# Patient Record
Sex: FEMALE | Race: WHITE | URBAN - NONMETROPOLITAN AREA
[De-identification: names, ages, dates, MRNs, and addresses within clinical notes are randomized per-mention and may not be internally consistent; named-entity substitution may affect disease eponyms.]

---

## 2022-10-22 ENCOUNTER — WEB ENCOUNTER (OUTPATIENT)
Dept: URBAN - NONMETROPOLITAN AREA CLINIC 2 | Facility: CLINIC | Age: 52
End: 2022-10-22

## 2023-03-17 ENCOUNTER — OFFICE VISIT (OUTPATIENT)
Dept: URBAN - NONMETROPOLITAN AREA CLINIC 2 | Facility: CLINIC | Age: 53
End: 2023-03-17
Payer: COMMERCIAL

## 2023-03-17 ENCOUNTER — LAB OUTSIDE AN ENCOUNTER (OUTPATIENT)
Dept: URBAN - NONMETROPOLITAN AREA CLINIC 2 | Facility: CLINIC | Age: 53
End: 2023-03-17

## 2023-03-17 ENCOUNTER — WEB ENCOUNTER (OUTPATIENT)
Dept: URBAN - NONMETROPOLITAN AREA CLINIC 2 | Facility: CLINIC | Age: 53
End: 2023-03-17

## 2023-03-17 VITALS
SYSTOLIC BLOOD PRESSURE: 109 MMHG | HEART RATE: 75 BPM | TEMPERATURE: 97.8 F | HEIGHT: 62 IN | WEIGHT: 113 LBS | DIASTOLIC BLOOD PRESSURE: 74 MMHG | BODY MASS INDEX: 20.8 KG/M2

## 2023-03-17 DIAGNOSIS — R10.13 DYSPEPSIA: ICD-10-CM

## 2023-03-17 PROBLEM — 430329007: Status: ACTIVE | Noted: 2023-03-17

## 2023-03-17 PROCEDURE — 99244 OFF/OP CNSLTJ NEW/EST MOD 40: CPT | Performed by: INTERNAL MEDICINE

## 2023-03-17 PROCEDURE — 99204 OFFICE O/P NEW MOD 45 MIN: CPT | Performed by: INTERNAL MEDICINE

## 2023-03-17 RX ORDER — SODIUM PICOSULFATE, MAGNESIUM OXIDE, AND ANHYDROUS CITRIC ACID 10; 3.5; 12 MG/160ML; G/160ML; G/160ML
160ML X 2 AS DIRECTED LIQUID ORAL ONCE
Qty: 320 MILLILITER | Refills: 0 | OUTPATIENT
Start: 2023-03-17

## 2023-03-17 NOTE — HPI-TODAY'S VISIT:
3/17/2023 Kathleen presents for colorectal cancer screening.  Her last was at age 32 by Dr. Garcia.  She denies any lower GI complaints.  Recently she has had increased dyspepsia.  She has a strong family history of celiac disease.  She does still have her gallbladder.  She has tried PPIs over-the-counter in the past.  She does take Tums as needed with some relief.  She has not had an upper endoscopy in the recent past.  Her father had celiac disease diagnosed by Dr. Garcia.  Today she agrees to labs, H. pylori breath test, and Pepcid 20 mg daily.  If no relief consider EGD for further evaluation.  We will schedule her colonoscopy as a screening.  MB

## 2023-03-19 LAB
A/G RATIO: 2.5
ALBUMIN: 4.7
ALKALINE PHOSPHATASE: 36
ALT (SGPT): 15
AST (SGOT): 19
BASO (ABSOLUTE): 0.1
BASOS: 1
BILIRUBIN, TOTAL: 0.2
BUN/CREATININE RATIO: 26
BUN: 20
C-REACTIVE PROTEIN, QUANT: <1
CALCIUM: 9.6
CARBON DIOXIDE, TOTAL: 27
CHLORIDE: 100
CREATININE: 0.78
DEAMIDATED GLIADIN ABS, IGA: 8
DEAMIDATED GLIADIN ABS, IGG: 2
EGFR: 92
ENDOMYSIAL ANTIBODY IGA: NEGATIVE
EOS (ABSOLUTE): 0.1
EOS: 1
GLOBULIN, TOTAL: 1.9
GLUCOSE: 101
HEMATOCRIT: 39.2
HEMATOLOGY COMMENTS:: (no result)
HEMOGLOBIN: 13.3
IMMATURE CELLS: (no result)
IMMATURE GRANS (ABS): 0
IMMATURE GRANULOCYTES: 0
IMMUNOGLOBULIN A, QN, SERUM: 217
LYMPHS (ABSOLUTE): 1.4
LYMPHS: 21
Lab: (no result)
MCH: 31.8
MCHC: 33.9
MCV: 94
MONOCYTES(ABSOLUTE): 0.4
MONOCYTES: 6
NEUTROPHILS (ABSOLUTE): 4.6
NEUTROPHILS: 71
NRBC: (no result)
PLATELETS: 243
POTASSIUM: 4.1
PROTEIN, TOTAL: 6.6
RBC: 4.18
RDW: 12.1
SEDIMENTATION RATE-WESTERGREN: 3
SODIUM: 139
T-TRANSGLUTAMINASE (TTG) IGA: <2
T-TRANSGLUTAMINASE (TTG) IGG: 7
T4,FREE(DIRECT): 0.95
TSH: 1.31
WBC: 6.6

## 2023-03-20 ENCOUNTER — LAB OUTSIDE AN ENCOUNTER (OUTPATIENT)
Dept: URBAN - NONMETROPOLITAN AREA CLINIC 2 | Facility: CLINIC | Age: 53
End: 2023-03-20

## 2023-03-21 ENCOUNTER — LAB OUTSIDE AN ENCOUNTER (OUTPATIENT)
Dept: URBAN - METROPOLITAN AREA CLINIC 35 | Facility: CLINIC | Age: 53
End: 2023-03-21

## 2023-03-21 ENCOUNTER — WEB ENCOUNTER (OUTPATIENT)
Dept: URBAN - NONMETROPOLITAN AREA CLINIC 2 | Facility: CLINIC | Age: 53
End: 2023-03-21

## 2023-03-21 ENCOUNTER — TELEPHONE ENCOUNTER (OUTPATIENT)
Dept: URBAN - METROPOLITAN AREA CLINIC 35 | Facility: CLINIC | Age: 53
End: 2023-03-21

## 2023-03-22 LAB
A/G RATIO: (no result)
ALBUMIN: (no result)
ALKALINE PHOSPHATASE: (no result)
ALT (SGPT): (no result)
AST (SGOT): (no result)
BASO (ABSOLUTE): (no result)
BASOS: (no result)
BILIRUBIN, TOTAL: (no result)
BUN/CREATININE RATIO: (no result)
BUN: (no result)
C-REACTIVE PROTEIN, QUANT: (no result)
CALCIUM: (no result)
CARBON DIOXIDE, TOTAL: (no result)
CHLORIDE: (no result)
CHOLESTEROL, TOTAL: (no result)
COMMENT:: (no result)
CREATININE: (no result)
DEAMIDATED GLIADIN ABS, IGA: (no result)
DEAMIDATED GLIADIN ABS, IGG: (no result)
EGFR: (no result)
ENDOMYSIAL ANTIBODY IGA: (no result)
EOS (ABSOLUTE): (no result)
EOS: (no result)
FERRITIN: (no result)
FOLATE (FOLIC ACID), SERUM: (no result)
FREE TESTOSTERONE(DIRECT): (no result)
GLOBULIN, TOTAL: (no result)
GLUCOSE: (no result)
H PYLORI BREATH TEST: NEGATIVE
HDL CHOLESTEROL: (no result)
HEMATOCRIT: (no result)
HEMATOLOGY COMMENTS:: (no result)
HEMOGLOBIN A1C: (no result)
HEMOGLOBIN: (no result)
IMMATURE CELLS: (no result)
IMMATURE GRANS (ABS): (no result)
IMMATURE GRANULOCYTES: (no result)
IMMUNOGLOBULIN A, QN, SERUM: (no result)
LDL CHOL CALC (NIH): (no result)
LYMPHS (ABSOLUTE): (no result)
LYMPHS: (no result)
MAGNESIUM: (no result)
MCH: (no result)
MCHC: (no result)
MCV: (no result)
MONOCYTES(ABSOLUTE): (no result)
MONOCYTES: (no result)
NEUTROPHILS (ABSOLUTE): (no result)
NEUTROPHILS: (no result)
NRBC: (no result)
PLATELETS: (no result)
POTASSIUM: (no result)
PROSTATE SPECIFIC AG: (no result)
PROTEIN, TOTAL: (no result)
RBC: (no result)
RDW: (no result)
REQUEST PROBLEM: (no result)
SEDIMENTATION RATE-WESTERGREN: (no result)
SODIUM: (no result)
T-TRANSGLUTAMINASE (TTG) IGA: (no result)
T-TRANSGLUTAMINASE (TTG) IGG: (no result)
T4,FREE(DIRECT): (no result)
TESTOSTERONE, TOTAL, LC/MS: (no result)
TRIGLYCERIDES: (no result)
TRIIODOTHYRONINE (T3), FREE: (no result)
TSH: (no result)
VITAMIN B12: (no result)
VITAMIN D, 25-HYDROXY: (no result)
VLDL CHOLESTEROL CAL: (no result)
WBC: (no result)

## 2023-03-27 ENCOUNTER — WEB ENCOUNTER (OUTPATIENT)
Dept: URBAN - NONMETROPOLITAN AREA CLINIC 2 | Facility: CLINIC | Age: 53
End: 2023-03-27

## 2023-05-08 ENCOUNTER — WEB ENCOUNTER (OUTPATIENT)
Dept: URBAN - NONMETROPOLITAN AREA CLINIC 2 | Facility: CLINIC | Age: 53
End: 2023-05-08

## 2023-05-08 RX ORDER — SODIUM PICOSULFATE, MAGNESIUM OXIDE, AND ANHYDROUS CITRIC ACID 10; 3.5; 12 MG/160ML; G/160ML; G/160ML
160ML X 2 AS DIRECTED LIQUID ORAL ONCE
Qty: 320 MILLILITER | Refills: 0
Start: 2023-03-17 | End: 2023-05-10

## 2023-05-09 ENCOUNTER — ERX REFILL RESPONSE (OUTPATIENT)
Dept: URBAN - NONMETROPOLITAN AREA CLINIC 2 | Facility: CLINIC | Age: 53
End: 2023-05-09

## 2023-05-09 RX ORDER — SODIUM PICOSULFATE, MAGNESIUM OXIDE, AND ANHYDROUS CITRIC ACID 10; 3.5; 12 MG/160ML; G/160ML; G/160ML
160ML X 2 AS DIRECTED LIQUID ORAL ONCE
Qty: 320 MILLILITER | Refills: 0 | OUTPATIENT

## 2023-05-09 RX ORDER — SODIUM PICOSULFATE, MAGNESIUM OXIDE, AND ANHYDROUS CITRIC ACID 12; 3.5; 1 G/175ML; G/175ML; MG/175ML
175 ML THE FIRST DOSE THE EVENING BEFORE AND SECOND DOSE THE MORNING OF COLONOSCOPY LIQUID ORAL ONCE A DAY
Qty: 350 | Refills: 0 | OUTPATIENT

## 2023-05-11 ENCOUNTER — WEB ENCOUNTER (OUTPATIENT)
Dept: URBAN - NONMETROPOLITAN AREA CLINIC 2 | Facility: CLINIC | Age: 53
End: 2023-05-11

## 2023-05-11 RX ORDER — SOD SULF/POT CHLORIDE/MAG SULF 1.479 G
24 TABLET TABLET ORAL ONCE
Qty: 1 UNSPECIFIED | Refills: 0 | OUTPATIENT
Start: 2023-05-12 | End: 2023-05-13

## 2023-05-22 ENCOUNTER — CLAIMS CREATED FROM THE CLAIM WINDOW (OUTPATIENT)
Dept: URBAN - NONMETROPOLITAN AREA SURGERY CENTER 1 | Facility: SURGERY CENTER | Age: 53
End: 2023-05-22

## 2023-05-22 ENCOUNTER — CLAIMS CREATED FROM THE CLAIM WINDOW (OUTPATIENT)
Dept: URBAN - NONMETROPOLITAN AREA SURGERY CENTER 1 | Facility: SURGERY CENTER | Age: 53
End: 2023-05-22
Payer: COMMERCIAL

## 2023-05-22 DIAGNOSIS — Z12.11 COLON CANCER SCREENING: ICD-10-CM

## 2023-05-22 DIAGNOSIS — K20.0 ALLERGIC EOSINOPHILIC ESOPHAGITIS: ICD-10-CM

## 2023-05-22 DIAGNOSIS — K29.80 ACUTE DUODENITIS: ICD-10-CM

## 2023-05-22 DIAGNOSIS — K29.60 ADENOPAPILLOMATOSIS GASTRICA: ICD-10-CM

## 2023-05-22 PROCEDURE — 43239 EGD BIOPSY SINGLE/MULTIPLE: CPT | Performed by: INTERNAL MEDICINE

## 2023-05-22 PROCEDURE — G8907 PT DOC NO EVENTS ON DISCHARG: HCPCS | Performed by: INTERNAL MEDICINE

## 2023-05-22 PROCEDURE — G0121 COLON CA SCRN NOT HI RSK IND: HCPCS | Performed by: INTERNAL MEDICINE

## 2023-05-22 RX ORDER — SODIUM PICOSULFATE, MAGNESIUM OXIDE, AND ANHYDROUS CITRIC ACID 12; 3.5; 1 G/175ML; G/175ML; MG/175ML
175 ML THE FIRST DOSE THE EVENING BEFORE AND SECOND DOSE THE MORNING OF COLONOSCOPY LIQUID ORAL ONCE A DAY
Qty: 350 | Refills: 0 | Status: ACTIVE | COMMUNITY

## 2023-05-23 ENCOUNTER — TELEPHONE ENCOUNTER (OUTPATIENT)
Dept: URBAN - NONMETROPOLITAN AREA CLINIC 2 | Facility: CLINIC | Age: 53
End: 2023-05-23

## 2023-05-23 RX ORDER — AZITHROMYCIN MONOHYDRATE 250 MG/1
TAKE 2 PILLS DAILY ONE AND THEN 1 PILL DAILY FOR 4 ADDITIONAL DAYS TABLET, FILM COATED ORAL ONCE DAILY
Qty: 6 | Refills: 0 | OUTPATIENT
Start: 2023-05-23 | End: 2023-05-28

## 2023-05-30 ENCOUNTER — WEB ENCOUNTER (OUTPATIENT)
Dept: URBAN - NONMETROPOLITAN AREA CLINIC 2 | Facility: CLINIC | Age: 53
End: 2023-05-30

## 2023-05-31 ENCOUNTER — WEB ENCOUNTER (OUTPATIENT)
Dept: URBAN - NONMETROPOLITAN AREA CLINIC 2 | Facility: CLINIC | Age: 53
End: 2023-05-31

## 2023-06-07 ENCOUNTER — TELEPHONE ENCOUNTER (OUTPATIENT)
Dept: URBAN - NONMETROPOLITAN AREA CLINIC 2 | Facility: CLINIC | Age: 53
End: 2023-06-07

## 2023-06-08 ENCOUNTER — WEB ENCOUNTER (OUTPATIENT)
Dept: URBAN - NONMETROPOLITAN AREA CLINIC 2 | Facility: CLINIC | Age: 53
End: 2023-06-08

## 2023-06-12 ENCOUNTER — OFFICE VISIT (OUTPATIENT)
Dept: URBAN - NONMETROPOLITAN AREA CLINIC 2 | Facility: CLINIC | Age: 53
End: 2023-06-12
Payer: COMMERCIAL

## 2023-06-12 VITALS
TEMPERATURE: 98.5 F | SYSTOLIC BLOOD PRESSURE: 114 MMHG | HEART RATE: 73 BPM | WEIGHT: 113 LBS | HEIGHT: 62 IN | BODY MASS INDEX: 20.8 KG/M2 | DIASTOLIC BLOOD PRESSURE: 77 MMHG

## 2023-06-12 DIAGNOSIS — Z83.79 FAMILY HISTORY OF CELIAC DISEASE: ICD-10-CM

## 2023-06-12 DIAGNOSIS — R89.4 ABNORMAL CELIAC ANTIBODY PANEL: ICD-10-CM

## 2023-06-12 DIAGNOSIS — R10.13 DYSPEPSIA: ICD-10-CM

## 2023-06-12 DIAGNOSIS — K20.0 EOSINOPHILIC ESOPHAGITIS: ICD-10-CM

## 2023-06-12 DIAGNOSIS — Z12.11 COLON CANCER SCREENING: ICD-10-CM

## 2023-06-12 PROBLEM — 305058001: Status: ACTIVE | Noted: 2023-03-17

## 2023-06-12 PROBLEM — 166166006: Status: ACTIVE | Noted: 2023-03-21

## 2023-06-12 PROBLEM — 235599003: Status: ACTIVE | Noted: 2023-06-12

## 2023-06-12 PROBLEM — 162031009: Status: ACTIVE | Noted: 2023-03-17

## 2023-06-12 PROCEDURE — 99214 OFFICE O/P EST MOD 30 MIN: CPT | Performed by: NURSE PRACTITIONER

## 2023-06-12 RX ORDER — SODIUM PICOSULFATE, MAGNESIUM OXIDE, AND ANHYDROUS CITRIC ACID 12; 3.5; 1 G/175ML; G/175ML; MG/175ML
175 ML THE FIRST DOSE THE EVENING BEFORE AND SECOND DOSE THE MORNING OF COLONOSCOPY LIQUID ORAL ONCE A DAY
Qty: 350 | Refills: 0 | Status: ACTIVE | COMMUNITY

## 2023-06-12 NOTE — HPI-TODAY'S VISIT:
3/17/2023 Kathleen presents for colorectal cancer screening.  Her last was at age 32 by Dr. Garcia.  She denies any lower GI complaints.  Recently she has had increased dyspepsia.  She has a strong family history of celiac disease.  She does still have her gallbladder.  She has tried PPIs over-the-counter in the past.  She does take Tums as needed with some relief.  She has not had an upper endoscopy in the recent past.  Her father had celiac disease diagnosed by Dr. Garcia.  Today she agrees to labs, H. pylori breath test, and Pepcid 20 mg daily.  If no relief consider EGD for further evaluation.  We will schedule her colonoscopy as a screening.  MB 6/12/2023 Kathleen presents for endoscopy follow-up.  Her TTG IgG was mildly elevated at 7.  Her colonoscopy was normal.  Her EGD shows normal small bowel, she has mild duodenitis on biopsies but she has eosinophilic esophagitis on esophageal biopsies.  She had no rings or furrows on endoscopy.  Her dyspepsia has seemed to improved on a gluten-free diet.  She is tolerating dairy.  Today she is doing well from a GI standpoint.  MB

## 2023-06-12 NOTE — PHYSICAL EXAM SKIN:
no rashes , no suspicious lesions Propranolol Pregnancy And Lactation Text: This medication is Pregnancy Category C and it isn't known if it is safe during pregnancy. It is excreted in breast milk.

## 2023-08-22 ENCOUNTER — WEB ENCOUNTER (OUTPATIENT)
Dept: URBAN - NONMETROPOLITAN AREA CLINIC 2 | Facility: CLINIC | Age: 53
End: 2023-08-22

## 2023-09-12 ENCOUNTER — OFFICE VISIT (OUTPATIENT)
Dept: URBAN - NONMETROPOLITAN AREA CLINIC 2 | Facility: CLINIC | Age: 53
End: 2023-09-12

## 2023-11-21 ENCOUNTER — WEB ENCOUNTER (OUTPATIENT)
Dept: URBAN - NONMETROPOLITAN AREA CLINIC 2 | Facility: CLINIC | Age: 53
End: 2023-11-21

## 2023-12-11 ENCOUNTER — OFFICE VISIT (OUTPATIENT)
Dept: URBAN - NONMETROPOLITAN AREA CLINIC 2 | Facility: CLINIC | Age: 53
End: 2023-12-11

## 2024-02-20 ENCOUNTER — LAB (OUTPATIENT)
Dept: URBAN - NONMETROPOLITAN AREA CLINIC 2 | Facility: CLINIC | Age: 54
End: 2024-02-20

## 2024-03-21 ENCOUNTER — OV EP (OUTPATIENT)
Dept: URBAN - NONMETROPOLITAN AREA CLINIC 2 | Facility: CLINIC | Age: 54
End: 2024-03-21
Payer: COMMERCIAL

## 2024-03-21 VITALS
HEIGHT: 62 IN | DIASTOLIC BLOOD PRESSURE: 68 MMHG | SYSTOLIC BLOOD PRESSURE: 104 MMHG | WEIGHT: 120 LBS | HEART RATE: 77 BPM | TEMPERATURE: 98.7 F | BODY MASS INDEX: 22.08 KG/M2

## 2024-03-21 DIAGNOSIS — Z83.79 FAMILY HISTORY OF CELIAC DISEASE: ICD-10-CM

## 2024-03-21 DIAGNOSIS — R89.4 ABNORMAL CELIAC ANTIBODY PANEL: ICD-10-CM

## 2024-03-21 DIAGNOSIS — K20.0 EOSINOPHILIC ESOPHAGITIS: ICD-10-CM

## 2024-03-21 DIAGNOSIS — R10.13 DYSPEPSIA: ICD-10-CM

## 2024-03-21 DIAGNOSIS — Z12.11 COLON CANCER SCREENING: ICD-10-CM

## 2024-03-21 PROCEDURE — 99214 OFFICE O/P EST MOD 30 MIN: CPT | Performed by: NURSE PRACTITIONER

## 2024-03-21 RX ORDER — ESOMEPRAZOLE MAGNESIUM 20 MG/1
1 CAPSULE CAPSULE, DELAYED RELEASE ORAL ONCE A DAY
Qty: 90 CAPSULE | Refills: 3 | Status: ACTIVE | COMMUNITY
Start: 2024-02-20

## 2024-03-21 RX ORDER — SODIUM PICOSULFATE, MAGNESIUM OXIDE, AND ANHYDROUS CITRIC ACID 12; 3.5; 1 G/175ML; G/175ML; MG/175ML
175 ML THE FIRST DOSE THE EVENING BEFORE AND SECOND DOSE THE MORNING OF COLONOSCOPY LIQUID ORAL ONCE A DAY
Qty: 350 | Refills: 0 | Status: ON HOLD | COMMUNITY

## 2024-03-21 NOTE — HPI-TODAY'S VISIT:
3/17/2023 Kathleen presents for colorectal cancer screening.  Her last was at age 32 by Dr. Garcia.  She denies any lower GI complaints.  Recently she has had increased dyspepsia.  She has a strong family history of celiac disease.  She does still have her gallbladder.  She has tried PPIs over-the-counter in the past.  She does take Tums as needed with some relief.  She has not had an upper endoscopy in the recent past.  Her father had celiac disease diagnosed by Dr. Garcia.  Today she agrees to labs, H. pylori breath test, and Pepcid 20 mg daily.  If no relief consider EGD for further evaluation.  We will schedule her colonoscopy as a screening.  MB 6/12/2023 Kathleen presents for endoscopy follow-up.  Her TTG IgG was mildly elevated at 7.  Her colonoscopy was normal.  Her EGD shows normal small bowel, she has mild duodenitis on biopsies but she has eosinophilic esophagitis on esophageal biopsies.  She had no rings or furrows on endoscopy.  Her dyspepsia has seemed to improved on a gluten-free diet.  She is tolerating dairy.  Today she is doing well from a GI standpoint.  MB 3/21/2024 Kathleen presents for follow-up of EOE.  She had a mild flare in February of epigastric abdominal pain.  This abated with 1 week of Nexium.  She continues to avoid gluten.  She is on multiple supplements.  Today she denies any new GI complaints.  She agrees to an 8-week trial of PPI therapy if she has another flare, versus repeat upper endoscopy.  MB

## 2024-09-19 ENCOUNTER — DASHBOARD ENCOUNTERS (OUTPATIENT)
Age: 54
End: 2024-09-19

## 2024-09-19 ENCOUNTER — OFFICE VISIT (OUTPATIENT)
Dept: URBAN - NONMETROPOLITAN AREA CLINIC 2 | Facility: CLINIC | Age: 54
End: 2024-09-19
Payer: COMMERCIAL

## 2024-09-19 VITALS
SYSTOLIC BLOOD PRESSURE: 115 MMHG | HEIGHT: 62 IN | TEMPERATURE: 98 F | WEIGHT: 112 LBS | HEART RATE: 79 BPM | BODY MASS INDEX: 20.61 KG/M2 | DIASTOLIC BLOOD PRESSURE: 71 MMHG

## 2024-09-19 DIAGNOSIS — R89.4 ABNORMAL CELIAC ANTIBODY PANEL: ICD-10-CM

## 2024-09-19 DIAGNOSIS — K20.80 ABSCESS OF ESOPHAGUS: ICD-10-CM

## 2024-09-19 DIAGNOSIS — R10.13 DYSPEPSIA: ICD-10-CM

## 2024-09-19 DIAGNOSIS — Z83.79 FAMILY HISTORY OF CELIAC DISEASE: ICD-10-CM

## 2024-09-19 PROCEDURE — 99214 OFFICE O/P EST MOD 30 MIN: CPT | Performed by: NURSE PRACTITIONER

## 2024-09-19 RX ORDER — ESOMEPRAZOLE MAGNESIUM 20 MG/1
1 CAPSULE CAPSULE, DELAYED RELEASE ORAL ONCE A DAY
Qty: 90 CAPSULE | Refills: 3 | Status: ACTIVE | COMMUNITY
Start: 2024-02-20

## 2024-09-19 RX ORDER — FAMOTIDINE 20 MG/1
TAKE 1 TABLET TABLET ORAL TWICE A DAY
Qty: 180 TABLET | Refills: 3 | OUTPATIENT
Start: 2024-09-19

## 2024-09-19 NOTE — HPI-TODAY'S VISIT:
3/17/2023 Kathleen presents for colorectal cancer screening.  Her last was at age 32 by Dr. Garcia.  She denies any lower GI complaints.  Recently she has had increased dyspepsia.  She has a strong family history of celiac disease.  She does still have her gallbladder.  She has tried PPIs over-the-counter in the past.  She does take Tums as needed with some relief.  She has not had an upper endoscopy in the recent past.  Her father had celiac disease diagnosed by Dr. Garcia.  Today she agrees to labs, H. pylori breath test, and Pepcid 20 mg daily.  If no relief consider EGD for further evaluation.  We will schedule her colonoscopy as a screening.  MB 6/12/2023 Kathleen presents for endoscopy follow-up.  Her TTG IgG was mildly elevated at 7.  Her colonoscopy was normal.  Her EGD shows normal small bowel, she has mild duodenitis on biopsies but she has eosinophilic esophagitis on esophageal biopsies.  She had no rings or furrows on endoscopy.  Her dyspepsia has seemed to improved on a gluten-free diet.  She is tolerating dairy.  Today she is doing well from a GI standpoint.  MB 3/21/2024 Kathleen presents for follow-up of EOE.  She had a mild flare in February of epigastric abdominal pain.  This abated with 1 week of Nexium.  She continues to avoid gluten.  She is on multiple supplements.  Today she denies any new GI complaints.  She agrees to an 8-week trial of PPI therapy if she has another flare, versus repeat upper endoscopy.  MB 9/19/2024 Kathleen presents for follow-up of EOE.  She has had worsening episodes of reflux with esophageal spasm.  She took Nexium with some loose stools.  Today she agrees to do Pepcid 20 mg twice daily.  She remains off gluten.  If no relief consider repeat EGD plus or minus PPI plus or minus Dupixent.  She does still have her gallbladder as well.  Consider ultrasound as well although her symptoms seem more esophageal.  MB

## 2025-01-21 ENCOUNTER — WEB ENCOUNTER (OUTPATIENT)
Dept: URBAN - NONMETROPOLITAN AREA CLINIC 2 | Facility: CLINIC | Age: 55
End: 2025-01-21

## 2025-01-23 ENCOUNTER — OFFICE VISIT (OUTPATIENT)
Dept: URBAN - NONMETROPOLITAN AREA CLINIC 2 | Facility: CLINIC | Age: 55
End: 2025-01-23